# Patient Record
Sex: FEMALE | Race: WHITE
[De-identification: names, ages, dates, MRNs, and addresses within clinical notes are randomized per-mention and may not be internally consistent; named-entity substitution may affect disease eponyms.]

---

## 2017-05-22 ENCOUNTER — HOSPITAL ENCOUNTER (OUTPATIENT)
Dept: HOSPITAL 61 - PCVCIMAG | Age: 82
Discharge: HOME | End: 2017-05-22
Attending: INTERNAL MEDICINE
Payer: MEDICARE

## 2017-05-22 DIAGNOSIS — I25.10: ICD-10-CM

## 2017-05-22 DIAGNOSIS — I65.23: Primary | ICD-10-CM

## 2017-05-22 PROCEDURE — A9500 TC99M SESTAMIBI: HCPCS

## 2017-05-22 PROCEDURE — 93017 CV STRESS TEST TRACING ONLY: CPT

## 2017-05-22 PROCEDURE — 78452 HT MUSCLE IMAGE SPECT MULT: CPT

## 2017-05-22 PROCEDURE — 93880 EXTRACRANIAL BILAT STUDY: CPT

## 2018-02-27 ENCOUNTER — HOSPITAL ENCOUNTER (OUTPATIENT)
Dept: HOSPITAL 61 - PCVCCLINIC | Age: 83
Discharge: HOME | End: 2018-02-27
Attending: INTERNAL MEDICINE
Payer: MEDICARE

## 2018-02-27 DIAGNOSIS — I77.9: ICD-10-CM

## 2018-02-27 DIAGNOSIS — Z79.82: ICD-10-CM

## 2018-02-27 DIAGNOSIS — I10: Primary | ICD-10-CM

## 2018-02-27 DIAGNOSIS — Z79.899: ICD-10-CM

## 2018-02-27 DIAGNOSIS — E78.00: ICD-10-CM

## 2018-02-27 PROCEDURE — 80061 LIPID PANEL: CPT

## 2018-02-27 PROCEDURE — 93005 ELECTROCARDIOGRAM TRACING: CPT

## 2018-10-08 ENCOUNTER — HOSPITAL ENCOUNTER (OUTPATIENT)
Dept: HOSPITAL 61 - PCVCIMAG | Age: 83
Discharge: HOME | End: 2018-10-08
Attending: INTERNAL MEDICINE
Payer: MEDICARE

## 2018-10-08 DIAGNOSIS — I65.23: Primary | ICD-10-CM

## 2018-10-08 DIAGNOSIS — E78.5: ICD-10-CM

## 2018-10-08 DIAGNOSIS — I10: ICD-10-CM

## 2018-10-08 DIAGNOSIS — E11.9: ICD-10-CM

## 2018-10-08 DIAGNOSIS — E78.00: ICD-10-CM

## 2018-10-08 DIAGNOSIS — I77.9: ICD-10-CM

## 2018-10-08 DIAGNOSIS — Z79.82: ICD-10-CM

## 2018-10-08 PROCEDURE — 93005 ELECTROCARDIOGRAM TRACING: CPT

## 2018-10-08 PROCEDURE — 93880 EXTRACRANIAL BILAT STUDY: CPT

## 2018-10-08 PROCEDURE — G0463 HOSPITAL OUTPT CLINIC VISIT: HCPCS

## 2018-10-08 PROCEDURE — 80061 LIPID PANEL: CPT

## 2019-07-01 ENCOUNTER — HOSPITAL ENCOUNTER (OUTPATIENT)
Dept: HOSPITAL 61 - PCVCIMAG | Age: 84
Discharge: HOME | End: 2019-07-01
Attending: INTERNAL MEDICINE
Payer: MEDICARE

## 2019-07-01 DIAGNOSIS — Z79.82: ICD-10-CM

## 2019-07-01 DIAGNOSIS — Z88.0: ICD-10-CM

## 2019-07-01 DIAGNOSIS — Z79.899: ICD-10-CM

## 2019-07-01 DIAGNOSIS — E78.00: ICD-10-CM

## 2019-07-01 DIAGNOSIS — I10: ICD-10-CM

## 2019-07-01 DIAGNOSIS — I65.23: Primary | ICD-10-CM

## 2019-07-01 DIAGNOSIS — Z88.8: ICD-10-CM

## 2019-07-01 DIAGNOSIS — I77.9: ICD-10-CM

## 2019-07-01 PROCEDURE — 36415 COLL VENOUS BLD VENIPUNCTURE: CPT

## 2019-07-01 PROCEDURE — 93005 ELECTROCARDIOGRAM TRACING: CPT

## 2019-07-01 PROCEDURE — 80061 LIPID PANEL: CPT

## 2019-07-01 PROCEDURE — 93880 EXTRACRANIAL BILAT STUDY: CPT

## 2019-07-01 PROCEDURE — G0463 HOSPITAL OUTPT CLINIC VISIT: HCPCS

## 2019-07-01 NOTE — PCVCIMAG
--------------- APPROVED REPORT 

--------------





Indications

Stenosis



Risk Factors

TIA/CVA History



Doppler Spectral Velocity Analysis

PSV  /  EDVPSV  /  EDV

ECA (R)     94 / 8 cm/sECA (L)     68 / 6 cm/s



dICA (R)    81 / 11 cm/sdICA (L)     81 / 21 cm/s

[place] (R)     123 / 18 cm/smICA (L)     87 / 22 cm/s

pICA (R)     172 / 35 cm/spICA (L)     74 / 16 cm/s



Bulb (R)        73 / 13 cm/sBulb (L)         55 / 13 cm/s



dCCA (R)     70 / 13 cm/sdCCA (L)     74 / 11 cm/s

mCCA (R)    94 / 8 cm/smCCA (L)    94 / 16 cm/s



Vert (R)     96 / 12 cm/sVert (L)     87 / 18 cm/s

ICA/CCA     1.83ICA/CCA     0.92



Basic Measurements

Blood Pressure:                                                 

Pulses:                       

                                Right           Left               

             RightLeft

Brachial(Sitting)               140/06tfTh753/80mmHgTemporal     





Real Time B-Mode Imaging

Vert. (R)AntegradeVert. (L)Antegrade



Findings

RIGHT CAROTID: The carotid bulb has moderate plaque. The proximal 

internal carotid artery shows 60% stenosis. The common carotid artery 

shows no significant stenosis. The external carotid artery shows no 

significant stenosis.



LEFT CAROTID:  The carotid bulb has moderate plaque. The proximal 

internal carotid artery shows <40% stenosis. The common carotid 

artery shows no significant stenosis. The external carotid artery 

shows no significant stenosis.



Conclusion

60% stenosis of the right internal carotid artery with moderate 

plaque.

<40% stenosis of the left internal carotid artery with moderate 

plaque.

No change since October 2018.

## 2019-07-30 ENCOUNTER — HOSPITAL ENCOUNTER (OUTPATIENT)
Dept: HOSPITAL 61 - PCVCCLINIC | Age: 84
Discharge: HOME | End: 2019-07-30
Attending: INTERNAL MEDICINE
Payer: MEDICARE

## 2019-07-30 DIAGNOSIS — R07.89: ICD-10-CM

## 2019-07-30 DIAGNOSIS — I77.9: ICD-10-CM

## 2019-07-30 DIAGNOSIS — Z79.82: ICD-10-CM

## 2019-07-30 DIAGNOSIS — I25.119: Primary | ICD-10-CM

## 2019-07-30 DIAGNOSIS — I10: ICD-10-CM

## 2019-07-30 DIAGNOSIS — E78.00: ICD-10-CM

## 2019-07-30 DIAGNOSIS — Z88.8: ICD-10-CM

## 2019-07-30 DIAGNOSIS — Z79.899: ICD-10-CM

## 2019-07-30 DIAGNOSIS — Z88.0: ICD-10-CM

## 2019-07-30 PROCEDURE — 93005 ELECTROCARDIOGRAM TRACING: CPT

## 2019-07-30 PROCEDURE — G0463 HOSPITAL OUTPT CLINIC VISIT: HCPCS

## 2019-08-06 ENCOUNTER — HOSPITAL ENCOUNTER (OUTPATIENT)
Dept: HOSPITAL 61 - PCVCIMAG | Age: 84
Discharge: HOME | End: 2019-08-06
Attending: INTERNAL MEDICINE
Payer: MEDICARE

## 2019-08-06 DIAGNOSIS — E78.00: ICD-10-CM

## 2019-08-06 DIAGNOSIS — I10: ICD-10-CM

## 2019-08-06 DIAGNOSIS — I25.10: Primary | ICD-10-CM

## 2019-08-06 PROCEDURE — 93325 DOPPLER ECHO COLOR FLOW MAPG: CPT

## 2019-08-06 PROCEDURE — 93351 STRESS TTE COMPLETE: CPT

## 2019-08-06 NOTE — PCVCIMAG
--------------- APPROVED REPORT --------------





Study performed:  08/06/2019 11:34:44



Exam:  Stress Echocardiogram

Indication: CAD

Patient Location: Echo lab

Stress Nurse: Haritha Flores RN

Status: routine



Ht: 5 ft 0 in  

HR: 64 bpm      BP: 142/72 mmHg

Rhythm: NSR



Medical History

Medical History: Hyperlipidemia, HTN



Procedure

The patient underwent an Exercise Stress Test using the Art 

Protocol. Blood pressure, heart rate, and EKG were monitored.

An Echocardiogram was performed by technician in four stages in quad 

fashion.  At peak stress, four selected images were obtained and 

placed side by side with resting images for comparison.



Stress Test Details

Stress Test:  Exercise stress testing was performed using a Art 

protocol.

HR

Resting HR:            64 bpmMax Heart Rate (APMHR): 133 bpm 

Max HR Achieved:  104 bpmTarget HR (85% APMHR): 113 bpm

% of APMHR:         78

Recovery HR:            69 bpm

HR response to stress: Normal HR response to stress



BP

Resting BP:  142/72 mmHg

Max BP:       146/78 mmHg

Recovery BP:       136/78 mmHg

BP response to stress: Normal blood pressure response to 

stress.

ECG

Resting ECG:  Sinus Rhythm

Stress ECG:     Sinus Rhythm

Recovery ECG: Sinus Rhythm



Clinical

Reason for Termination: Maximal effort, Fatigue

Exercise duration: 3 min 29 sec

Highest Stage Achieved: Stage 2: 2.5 mph at 12% grade. 

Exercise capacity: 5.70 METs

Overall Exercise Capacity for Age: Average



Stress ECG Conclusion

ECG: Non-ischemic

Clinical: Non-ischemic

Normal submaximal stress test.



Pre-Stress Echo

The resting Echocardiogram showed normal left ventricular 

contractility with an estimated Ejection Fraction of about >55%. 

Normal wall motion in all segments on baseline images.



Post-Stress Echo

The stress Echocardiogram showed normal left ventricular 

contractility with an estimated Ejection Fraction of about 60-65%. 

Normal augmentation of wall motion in all segments on post stress 

images.



Clinical

No clinical or ECG evidence for ischemia.



Conclusion

Clinical Response:  Non-ischemic

Exercise Capacity:  Below Average

Stress ECG Response:  Non-ischemic

Stress Echo Images:  Non-ischemic

The left ventricle is normal in size and wall thickness in both the 

rest and stress images.

No clinical, EKG or echocardiographic evidence for ischemia. 

Normal stress echocardiogram with submaximal exercise stress.



Other Information

Study Quality: Adequate



<Conclusion>

The left ventricle is normal in size and wall thickness in both the 

rest and stress images.

No clinical, EKG or echocardiographic evidence for ischemia. 

Normal stress echocardiogram with submaximal exercise stress.